# Patient Record
Sex: MALE | Race: WHITE | Employment: UNEMPLOYED | URBAN - METROPOLITAN AREA
[De-identification: names, ages, dates, MRNs, and addresses within clinical notes are randomized per-mention and may not be internally consistent; named-entity substitution may affect disease eponyms.]

---

## 2020-07-22 ENCOUNTER — TELEPHONE (OUTPATIENT)
Dept: PRIMARY CARE CLINIC | Age: 60
End: 2020-07-22

## 2020-07-22 DIAGNOSIS — Z79.01 LONG TERM (CURRENT) USE OF ANTICOAGULANTS: Primary | ICD-10-CM

## 2020-07-28 NOTE — TELEPHONE ENCOUNTER
OK please let him know his INR was 3.2 at goal range. I put in some new  Standing  Orders. Mena Garcia He can k start checking 1 x per month now since its stable.

## 2020-07-28 NOTE — TELEPHONE ENCOUNTER
INR   3.2   0.8-1.2   above high normal   final   01                  Reference interval is for non-anticoagulated patients.                     Suggested INR therapeutic range for Vitamin K                 antagonist therapy:                    Standard Dose (moderate intensity                                   therapeutic range):       2.0 - 3.0                    Higher intensity therapeutic range       2.5 - 3.5  prothrombin time   32.2   9.1-12.0   sec   above high normal

## 2020-08-18 ENCOUNTER — TELEPHONE (OUTPATIENT)
Dept: PRIMARY CARE CLINIC | Age: 60
End: 2020-08-18

## 2020-08-18 DIAGNOSIS — Z79.01 LONG TERM (CURRENT) USE OF ANTICOAGULANTS: Primary | ICD-10-CM

## 2020-08-19 LAB
INR PPP: 3.3 (ref 0.8–1.2)
PROTHROMBIN TIME: 33.9 SEC (ref 9.1–12)

## 2020-08-19 NOTE — TELEPHONE ENCOUNTER
INR 3.3. At goal .  I thought pt was  Getting labs monthly no longer ever 2 weeks since his numbers are stable.

## 2020-08-20 ENCOUNTER — TELEPHONE (OUTPATIENT)
Dept: PRIMARY CARE CLINIC | Age: 60
End: 2020-08-20

## 2020-09-15 LAB
INR PPP: 3.4 (ref 0.8–1.2)
PROTHROMBIN TIME: 34.7 SEC (ref 9.1–12)

## 2020-09-17 ENCOUNTER — TELEPHONE (OUTPATIENT)
Dept: PRIMARY CARE CLINIC | Age: 60
End: 2020-09-17

## 2020-09-22 ENCOUNTER — TELEPHONE (OUTPATIENT)
Dept: PRIMARY CARE CLINIC | Age: 60
End: 2020-09-22

## 2020-09-22 NOTE — TELEPHONE ENCOUNTER
Patient is requesting his test results from his lab work that he had done last Monday he said that he went onto the website and they stated that they already sent the results to us on last Wednesday so he wants a call asap at [phone].

## 2020-09-22 NOTE — TELEPHONE ENCOUNTER
I spoke about his labs. Pt said last month (08/2020) his INR was 3.3.   *Pt said he will need a new order if he have to go and get his INR check in 2 weeks. *Pt want to know should he just take 4mg of his Warfarin every day instead of changing the order for him to go and BW done in 2wks? *Pt said he take Warfarin 4mg every day but Monday and Thursday he take 6mg he said he can do 4mg or 4.5mg on Monday and Thursday and keep the other days the same dose. INR 3.4. at upperlimit of goal  Recheck in 2 weeks instead of 1 month.

## 2020-09-23 NOTE — TELEPHONE ENCOUNTER
I like pts martha. Since its the same as last month then OK to wait a month for recheck. How about  4 mg everyday except  Sarai Zazueta he does 5 mg? Does he need any 1 mg or 5 mg tabs?

## 2020-09-24 NOTE — TELEPHONE ENCOUNTER
Pt is going to do 6 on wed and 4 the rest of the days.  Confirm with Dr. gomez that, that is fine and pt made aware

## 2020-10-13 LAB
INR PPP: 2.7 (ref 0.9–1.2)
PROTHROMBIN TIME: 28 SEC (ref 9.1–12)

## 2020-10-15 ENCOUNTER — TELEPHONE (OUTPATIENT)
Dept: PRIMARY CARE CLINIC | Age: 60
End: 2020-10-15

## 2020-11-10 LAB
INR PPP: 2.8 (ref 0.9–1.2)
PROTHROMBIN TIME: 28.6 SEC (ref 9.1–12)

## 2020-12-09 LAB
INR PPP: 2.7 (ref 0.9–1.2)
PROTHROMBIN TIME: 28 SEC (ref 9.1–12)

## 2020-12-13 DIAGNOSIS — Z79.01 LONG TERM (CURRENT) USE OF ANTICOAGULANTS: ICD-10-CM

## 2021-01-04 ENCOUNTER — TELEPHONE (OUTPATIENT)
Dept: PRIMARY CARE CLINIC | Age: 61
End: 2021-01-04

## 2021-01-04 DIAGNOSIS — Z79.01 LONG TERM (CURRENT) USE OF ANTICOAGULANTS: Primary | ICD-10-CM

## 2021-01-05 LAB
INR PPP: 2.2 (ref 0.9–1.2)
PROTHROMBIN TIME: 22.9 SEC (ref 9.1–12)

## 2021-01-05 NOTE — TELEPHONE ENCOUNTER
Completed. Can you check his results from yesterday.  It looks like it was a little low from what it is normally

## 2021-01-13 DIAGNOSIS — Z79.01 LONG TERM (CURRENT) USE OF ANTICOAGULANTS: ICD-10-CM

## 2021-01-19 LAB
INR PPP: 2.8 (ref 0.9–1.2)
PROTHROMBIN TIME: 28.4 SEC (ref 9.1–12)

## 2021-03-02 LAB
INR PPP: 3.8 (ref 0.9–1.2)
PROTHROMBIN TIME: 38.6 SEC (ref 9.1–12)

## 2021-03-03 NOTE — PROGRESS NOTES
INR 3.8  Ask pt to skip a dose and recheck in 1 week. Tell pt I am leaving and he will need to re-establish with another provider here soon to avoid gap in care.

## 2021-03-10 LAB
INR PPP: 2.6 (ref 0.9–1.2)
PROTHROMBIN TIME: 27.2 SEC (ref 9.1–12)

## 2021-04-05 DIAGNOSIS — Z79.01 LONG TERM (CURRENT) USE OF ANTICOAGULANTS: Primary | ICD-10-CM

## 2021-04-06 LAB
INR PPP: 3.3 (ref 0.9–1.2)
PROTHROMBIN TIME: 33.9 SEC (ref 9.1–12)

## 2021-04-09 ENCOUNTER — TELEPHONE (OUTPATIENT)
Dept: PRIMARY CARE CLINIC | Age: 61
End: 2021-04-09

## 2021-04-09 NOTE — TELEPHONE ENCOUNTER
Called and updated of bw results per Dr. Bert Villafana note. Patient to f/u with Dr. Sarah Franks next week.  --Fredy Angel

## 2021-04-20 ENCOUNTER — VIRTUAL VISIT (OUTPATIENT)
Dept: PRIMARY CARE CLINIC | Age: 61
End: 2021-04-20
Payer: MEDICARE

## 2021-04-20 DIAGNOSIS — Z92.29 COVID-19 VACCINE SERIES COMPLETED: ICD-10-CM

## 2021-04-20 DIAGNOSIS — E27.40 ADRENAL INSUFFICIENCY (HCC): ICD-10-CM

## 2021-04-20 DIAGNOSIS — Z86.718 HISTORY OF DVT (DEEP VEIN THROMBOSIS): ICD-10-CM

## 2021-04-20 DIAGNOSIS — Z86.2 HISTORY OF SARCOIDOSIS: ICD-10-CM

## 2021-04-20 DIAGNOSIS — Z79.01 CURRENT USE OF LONG TERM ANTICOAGULATION: Primary | ICD-10-CM

## 2021-04-20 PROCEDURE — 3017F COLORECTAL CA SCREEN DOC REV: CPT | Performed by: FAMILY MEDICINE

## 2021-04-20 PROCEDURE — 99214 OFFICE O/P EST MOD 30 MIN: CPT | Performed by: FAMILY MEDICINE

## 2021-04-20 PROCEDURE — G8427 DOCREV CUR MEDS BY ELIG CLIN: HCPCS | Performed by: FAMILY MEDICINE

## 2021-04-20 PROCEDURE — G8432 DEP SCR NOT DOC, RNG: HCPCS | Performed by: FAMILY MEDICINE

## 2021-04-20 RX ORDER — HYDROCORTISONE 5 MG/1
5 TABLET ORAL
COMMUNITY
Start: 2021-03-21

## 2021-04-20 RX ORDER — HYDROCORTISONE 10 MG/1
10 TABLET ORAL DAILY
COMMUNITY
Start: 2021-03-25

## 2021-04-20 RX ORDER — WARFARIN 4 MG/1
4 TABLET ORAL DAILY
COMMUNITY
Start: 2021-02-27 | End: 2021-05-27 | Stop reason: SDUPTHER

## 2021-04-20 NOTE — PROGRESS NOTES
There were no vitals taken for this visit. 1. Have you been to the ER, urgent care clinic since your last visit? Hospitalized since your last visit? No    2. Have you seen or consulted any other health care providers outside of the [address] [place] [address] since your last visit? Include any pap smears or colon screening.  No   Chief Complaint   Patient presents with   [address]

## 2021-04-20 NOTE — PROGRESS NOTES
HPI     Chief Complaint   Patient presents with    Establish Care        HPI:  Nilo Ocampo is a 61 y.o. male who has a history of lupus anticoagulant and history of DVT and sarcoidosis. He also has a history of adrenal insufficiency likely 2/2 steroid use for sarcoid. He is on coumadin 4mg daily, aside from Wednesday he takes 6mg. He says a previous hematologist told him his goal INR was 2.5-3.5 due to his complex history. He has not followed up with hematology in some time, does not want to pay $50 copay for each visit. States that he is on hydrocort for adrenal insufficiency. Could not stop therapy prior to COVID vaccines. Wants antibody test. Also wants to check his platelets. Review of Systems   Constitutional: Negative for chills and fever. Respiratory: Negative for shortness of breath. Cardiovascular: Negative for chest pain. Gastrointestinal: Negative for blood in stool. Reviewed PmHx, FmHx, SocHx as well as meds and allergies, updated and dated in the chart. Objective     Vital Signs: (As obtained by patient/caregiver at home)  There were no vitals taken for this visit. Patient-Reported Vitals 4/20/2021   Patient-Reported Weight 155lbs     Patient with poor service and difficulty connecting to video.     [INSTRUCTIONS:  \"[x]\" Indicates a positive item  \"[]\" Indicates a negative item  -- DELETE ALL ITEMS NOT EXAMINED]    Constitutional: [] Appears well-developed and well-nourished [x] No apparent distress      [] Abnormal -     Mental status: [x] Alert and awake  [x] Oriented to person/place/time [] Able to follow commands    [] Abnormal -     Eyes:   EOM    []  Normal    [] Abnormal -   Sclera  []  Normal    [] Abnormal -          Discharge []  None visible   [] Abnormal -     HENT: [] Normocephalic, atraumatic  [] Abnormal -   [] Mouth/Throat: Mucous membranes are moist    External Ears [x] Normal  [] Abnormal -    Neck: [] No visualized mass [] Abnormal - Pulmonary/Chest: [] Respiratory effort normal   [] No visualized signs of difficulty breathing or respiratory distress        [] Abnormal -      Musculoskeletal:   [] Normal gait with no signs of ataxia         [] Normal range of motion of neck        [] Abnormal -     Neurological:        [] No Facial Asymmetry (Cranial nerve 7 motor function) (limited exam due to video visit)          [] No gaze palsy        [] Abnormal -          Skin:        [] No significant exanthematous lesions or discoloration noted on facial skin         [] Abnormal -            Psychiatric:       [] Normal Affect [] Abnormal -        [] No Hallucinations    Other pertinent observable physical exam findings:-     On this date 04/20/2021 I have spent 30 minutes reviewing previous notes, test results and face to face (virtual) with the patient discussing the diagnosis and importance of compliance with the treatment plan as well as documenting on the day of the visit. Assessment and Plan     Diagnoses and all orders for this visit:    1. Current use of long term anticoagulation: should touch base with hematology again. Referral placed. Already has year long standing order for PT/INR checks.   -     REFERRAL TO HEMATOLOGY  -     CBC W/O DIFF  -      PT/INR    2. History of DVT (deep vein thrombosis): see above  -     REFERRAL TO HEMATOLOGY    3. COVID-19 vaccine series completed: discussed limitations of antibody testing. Patient verbalized understanding and still wants to check. - SARS COV 2 IGG    4. Adrenal insufficiency: continue steroids, followed by endocrinology. 5. History of sarcoidosis    Follow-up and Dispositions    · Return in about 3 months (around 7/20/2021) for chronic follow up, annual physical.          Nicki Rowan is being evaluated by a Virtual Visit (video visit) encounter to address concerns as mentioned above. A caregiver was present when appropriate.  Due to this being a TeleHealth encounter (During WHIZD-75 public health emergency), evaluation of the following organ systems was limited: Vitals/Constitutional/EENT/Resp/CV/GI//MS/Neuro/Skin/Heme-Lymph-Imm. Pursuant to the emergency declaration under the [facility], [address] [place] [address] authority and the [employer] Act, this Virtual Visit was conducted with patient's (and/or legal guardian's) consent, to reduce the patient's risk of exposure to COVID-19 and provide necessary medical care. The patient (and/or legal guardian) has also been advised to contact this office for worsening conditions or problems, and seek emergency medical treatment and/or call 911 if deemed necessary. Patient identification was verified at the start of the visit: YES    Services were provided through a synchronous discussion virtually to substitute for in-person clinic visit. Patient was located at home and provider was located in office or at home.        Austyn Romero MD  [address] [place] [address]

## 2021-05-04 ENCOUNTER — TELEPHONE (OUTPATIENT)
Dept: PRIMARY CARE CLINIC | Age: 61
End: 2021-05-04

## 2021-05-04 NOTE — TELEPHONE ENCOUNTER
Just an FYI, it appears patient is going to be seeing you from now on. Pt hs a standing order for PT/INR order by Dr. Yoandy Jones that expires in July.  He usually comes in once a months to get it check

## 2021-05-06 LAB
DIASORIN SARS-COV-2 AB, IGG, RCVG3T: POSITIVE
ERYTHROCYTE [DISTWIDTH] IN BLOOD BY AUTOMATED COUNT: 14.7 % (ref 11.6–15.4)
HCT VFR BLD AUTO: 42.4 % (ref 37.5–51)
HGB BLD-MCNC: 14 G/DL (ref 13–17.7)
INR PPP: 3.8 (ref 0.9–1.2)
MCH RBC QN AUTO: 26.3 PG (ref 26.6–33)
MCHC RBC AUTO-ENTMCNC: 33 G/DL (ref 31.5–35.7)
MCV RBC AUTO: 80 FL (ref 79–97)
PLATELET # BLD AUTO: 171 X10E3/UL (ref 150–450)
PROTHROMBIN TIME: 38.6 SEC (ref 9.1–12)
RBC # BLD AUTO: 5.33 X10E6/UL (ref 4.14–5.8)
WBC # BLD AUTO: 9.9 X10E3/UL (ref 3.4–10.8)

## 2021-05-07 DIAGNOSIS — Z79.01 LONG TERM (CURRENT) USE OF ANTICOAGULANTS: Primary | ICD-10-CM

## 2021-05-07 NOTE — TELEPHONE ENCOUNTER
Pls call patient. Platelets normal.  COVID antibodies present. Still practice safety as recommended by CDC. PT slightly elevated. Any changes to routine? We should recheck this in a week. Lab order placed. Come in by Wednesday to have lab drawn.     Dr. Raymond Chin

## 2021-05-11 NOTE — TELEPHONE ENCOUNTER
Called and updated on bw results. Coming in for bw on 5/17/2021. No other concerns at this time.  --Patricia Lopez

## 2021-05-27 ENCOUNTER — TELEPHONE (OUTPATIENT)
Dept: PRIMARY CARE CLINIC | Age: 61
End: 2021-05-27

## 2021-05-27 RX ORDER — WARFARIN 4 MG/1
4 TABLET ORAL DAILY
Qty: 32 TABLET | Refills: 2 | Status: SHIPPED | OUTPATIENT
Start: 2021-05-27 | End: 2021-11-02 | Stop reason: SDUPTHER

## 2021-05-27 NOTE — TELEPHONE ENCOUNTER
Pt said his pharmacy e-scripted a refill request for his warfarin but we dont have it in chart -- please refill and send to [facility] on [address] [phone]

## 2021-06-23 LAB
INR PPP: 3 (ref 0.9–1.2)
PROTHROMBIN TIME: 30.5 SEC (ref 9.1–12)

## 2021-07-01 ENCOUNTER — TELEPHONE (OUTPATIENT)
Dept: PRIMARY CARE CLINIC | Age: 61
End: 2021-07-01

## 2021-07-20 ENCOUNTER — TELEPHONE (OUTPATIENT)
Dept: PRIMARY CARE CLINIC | Age: 61
End: 2021-07-20

## 2021-07-20 DIAGNOSIS — Z79.01 CURRENT USE OF LONG TERM ANTICOAGULATION: Primary | ICD-10-CM

## 2021-07-20 DIAGNOSIS — Z86.718 HISTORY OF DVT (DEEP VEIN THROMBOSIS): Primary | ICD-10-CM

## 2021-07-20 DIAGNOSIS — Z79.01 CURRENT USE OF LONG TERM ANTICOAGULATION: ICD-10-CM

## 2021-07-21 LAB
INR PPP: 2.8 (ref 0.9–1.2)
PROTHROMBIN TIME: 28.7 SEC (ref 9.1–12)

## 2021-07-22 NOTE — PROGRESS NOTES
INR at goal.   Hx of DVT. Goal 2.5-3.5 per patient (hx of lupus anticoagulant syndrome as well). Follow up in 1 month.      Dr. Darren Phillips

## 2021-08-17 LAB
INR PPP: 2.9 (ref 0.9–1.2)
PROTHROMBIN TIME: 29.4 SEC (ref 9.1–12)

## 2021-08-24 NOTE — PROGRESS NOTES
Called and talked with patient regarding bw. No changes and repeat in one month. No other concerns at this time.

## 2021-09-21 ENCOUNTER — TELEPHONE (OUTPATIENT)
Dept: PRIMARY CARE CLINIC | Age: 61
End: 2021-09-21

## 2021-09-21 NOTE — TELEPHONE ENCOUNTER
Called and talked with patient regarding bw results. Continue with current plan and repeat in one month.

## 2021-10-12 ENCOUNTER — DOCUMENTATION ONLY (OUTPATIENT)
Dept: PRIMARY CARE CLINIC | Age: 61
End: 2021-10-12

## 2021-10-12 ENCOUNTER — TELEPHONE (OUTPATIENT)
Dept: PRIMARY CARE CLINIC | Age: 61
End: 2021-10-12

## 2021-10-12 DIAGNOSIS — R79.1 SUPRATHERAPEUTIC INR: Primary | ICD-10-CM

## 2021-10-12 LAB
INR PPP: 2.8 (ref 0.9–1.2)
INR PPP: 7 (ref 0.9–1.2)
PROTHROMBIN TIME: 26.8 SEC (ref 9.1–12)
PROTHROMBIN TIME: 68.1 SEC (ref 9.1–12)

## 2021-10-12 RX ORDER — WARFARIN 3 MG/1
TABLET ORAL
Qty: 36 TABLET | Refills: 1 | Status: SHIPPED | OUTPATIENT
Start: 2021-10-12 | End: 2022-02-08

## 2021-10-12 NOTE — PROGRESS NOTES
Received notice for supratherapeutic INR of 7.0    No active signs of bleeding reported to nursing. Holding coumadin today and tomorrow. Checking labs daily through Friday. Restarting coumadin on Thursday: Patient is to take 3mg Tuesday, Thursday, and Saturday. Patient is to continue 4mg on the other days. Will recheck INR on the new dose on Monday. Bleeding precautions to be reviewed with patient by nursing.     Arline Yang MD

## 2021-10-12 NOTE — PROGRESS NOTES
Received notice from nursing for INR 7.0. No signs of active bleeding. Hold coumadin today and tomorrow. Restart on Thursday. Will lower weekly total dose: 3mg on Tuesday/Thursday/Saturday. 4mg the remaining days. Checking Coumadin level daily today through Friday and again on Monday. Will adjust recs as needed. Bleeding precautions reviewed with patient per nursing.      Dr. Helms

## 2021-10-13 NOTE — PROGRESS NOTES
INR noted. Spoke with NP Bianca Edwards yesterday evening as she received an on call message regarding his repeat INR which is now 2.8 (previously reported at 7.0)    Patient is to continue coumdain at 4mg daily and will recheck PT/INR on Friday.     Dr. YIP [facility]

## 2021-10-16 LAB
INR PPP: 2.6 (ref 0.9–1.2)
PROTHROMBIN TIME: 26.2 SEC (ref 9.1–12)

## 2021-11-02 RX ORDER — WARFARIN 4 MG/1
4 TABLET ORAL DAILY
Qty: 32 TABLET | Refills: 2 | Status: SHIPPED | OUTPATIENT
Start: 2021-11-02 | End: 2022-01-28

## 2021-11-16 LAB
INR PPP: 3 (ref 0.9–1.2)
PROTHROMBIN TIME: 30.4 SEC (ref 9.1–12)

## 2021-12-14 DIAGNOSIS — Z86.718 HISTORY OF DVT (DEEP VEIN THROMBOSIS): ICD-10-CM

## 2021-12-14 DIAGNOSIS — Z79.01 CURRENT USE OF LONG TERM ANTICOAGULATION: Primary | ICD-10-CM

## 2021-12-14 LAB
INR PPP: 2.4 (ref 0.9–1.2)
PROTHROMBIN TIME: 24.3 SEC (ref 9.1–12)

## 2022-01-11 LAB
INR PPP: 2.5 (ref 0.9–1.2)
PROTHROMBIN TIME: 25.7 SEC (ref 9.1–12)

## 2022-01-28 RX ORDER — WARFARIN 4 MG/1
TABLET ORAL
Qty: 90 TABLET | Refills: 1 | Status: SHIPPED | OUTPATIENT
Start: 2022-01-28 | End: 2022-04-14 | Stop reason: SDUPTHER

## 2022-02-08 DIAGNOSIS — Z86.718 HISTORY OF DVT (DEEP VEIN THROMBOSIS): Primary | ICD-10-CM

## 2022-02-08 DIAGNOSIS — Z79.01 CURRENT USE OF LONG TERM ANTICOAGULATION: ICD-10-CM

## 2022-02-08 LAB
INR PPP: 2.2 (ref 0.9–1.2)
PROTHROMBIN TIME: 22.8 SEC (ref 9.1–12)

## 2022-02-08 NOTE — PROGRESS NOTES
Rupa, please arrange lab only appt. I called patient, INR lower- goal 2.5-3.5. Patient is doing coumadin 4mg daily currently. He will continue 4mg daily but 6mg on Tuesday and Thursday. Lab check next Monday or Tuesday.

## 2022-02-14 ENCOUNTER — TELEPHONE (OUTPATIENT)
Dept: PRIMARY CARE CLINIC | Age: 62
End: 2022-02-14

## 2022-02-14 DIAGNOSIS — Z86.718 HISTORY OF DVT (DEEP VEIN THROMBOSIS): Primary | ICD-10-CM

## 2022-02-14 DIAGNOSIS — Z79.01 CURRENT USE OF LONG TERM ANTICOAGULATION: ICD-10-CM

## 2022-02-14 NOTE — TELEPHONE ENCOUNTER
Patient is asking that you extend his standing lab orders for another year. He says he will not come in for an appointment of any type until he is certain the pandemic is over.  So needs these to be extended beyond April

## 2022-02-15 DIAGNOSIS — Z86.718 HISTORY OF DVT (DEEP VEIN THROMBOSIS): Primary | ICD-10-CM

## 2022-02-15 DIAGNOSIS — Z79.01 CURRENT USE OF LONG TERM ANTICOAGULATION: ICD-10-CM

## 2022-02-15 LAB
INR PPP: 2.5 (ref 0.9–1.2)
PROTHROMBIN TIME: 25.1 SEC (ref 9.1–12)

## 2022-02-15 RX ORDER — WARFARIN 2 MG/1
TABLET ORAL
Qty: 8 TABLET | Refills: 2 | Status: SHIPPED | OUTPATIENT
Start: 2022-02-15 | End: 2022-04-14 | Stop reason: SDUPTHER

## 2022-02-15 NOTE — TELEPHONE ENCOUNTER
I called and spoke with patient. Patient's previous PCP left clinic. He had standing lab orders which I continued as a courtesy. Appears patient has not even seen PCP in quite some time (at least 2 years). Explained he needs an in office appt and eval to establish care and physical as I cannot continue labs indefinitely and labs were already extended. He verbalized understanding.      Dr. Nj Flank

## 2022-02-15 NOTE — TELEPHONE ENCOUNTER
Patient notified of INR, 2.5. He's doing 6mg T and R and 4mg all other days for one week now. Will recheck INR in 1 week and make more adjustments if needed.

## 2022-03-01 LAB
INR PPP: 3.7 (ref 0.9–1.2)
PROTHROMBIN TIME: 37.1 SEC (ref 9.1–12)

## 2022-03-02 NOTE — PROGRESS NOTES
INR supratherapeutic. Will cut back: patient is to do 6mg only on Wednesday, 4mg every other day. Bleeding precautions given.    Lab visit to do lab PT/INR check, patient prefers 11:30A

## 2022-03-15 LAB
INR PPP: 3.2 (ref 0.9–1.2)
PROTHROMBIN TIME: 31.8 SEC (ref 9.1–12)

## 2022-03-18 PROBLEM — Z86.718 HISTORY OF DVT (DEEP VEIN THROMBOSIS): Status: ACTIVE | Noted: 2021-04-20

## 2022-03-19 PROBLEM — Z79.01 CURRENT USE OF LONG TERM ANTICOAGULATION: Status: ACTIVE | Noted: 2021-04-20

## 2022-04-13 ENCOUNTER — OFFICE VISIT (OUTPATIENT)
Dept: PRIMARY CARE CLINIC | Age: 62
End: 2022-04-13
Payer: MEDICARE

## 2022-04-13 VITALS
SYSTOLIC BLOOD PRESSURE: 138 MMHG | DIASTOLIC BLOOD PRESSURE: 83 MMHG | WEIGHT: 154.2 LBS | TEMPERATURE: 97.4 F | OXYGEN SATURATION: 98 % | HEART RATE: 65 BPM | RESPIRATION RATE: 20 BRPM

## 2022-04-13 DIAGNOSIS — Z79.01 CURRENT USE OF LONG TERM ANTICOAGULATION: ICD-10-CM

## 2022-04-13 DIAGNOSIS — Z12.5 SCREENING FOR MALIGNANT NEOPLASM OF PROSTATE: ICD-10-CM

## 2022-04-13 DIAGNOSIS — R74.8 ELEVATED ALKALINE PHOSPHATASE LEVEL: ICD-10-CM

## 2022-04-13 DIAGNOSIS — Z93.3 S/P COLOSTOMY (HCC): ICD-10-CM

## 2022-04-13 DIAGNOSIS — Z13.6 ENCOUNTER FOR SCREENING FOR CARDIOVASCULAR DISORDERS: ICD-10-CM

## 2022-04-13 DIAGNOSIS — Z11.59 NEED FOR HEPATITIS C SCREENING TEST: ICD-10-CM

## 2022-04-13 DIAGNOSIS — Z86.2 HISTORY OF SARCOIDOSIS: ICD-10-CM

## 2022-04-13 DIAGNOSIS — E27.40 ADRENAL INSUFFICIENCY (HCC): ICD-10-CM

## 2022-04-13 DIAGNOSIS — Z12.11 SCREENING FOR MALIGNANT NEOPLASM OF COLON: ICD-10-CM

## 2022-04-13 DIAGNOSIS — Z00.00 MEDICARE ANNUAL WELLNESS VISIT, SUBSEQUENT: Primary | ICD-10-CM

## 2022-04-13 DIAGNOSIS — Z86.718 HISTORY OF DVT (DEEP VEIN THROMBOSIS): ICD-10-CM

## 2022-04-13 PROCEDURE — G0439 PPPS, SUBSEQ VISIT: HCPCS | Performed by: FAMILY MEDICINE

## 2022-04-13 PROCEDURE — G8427 DOCREV CUR MEDS BY ELIG CLIN: HCPCS | Performed by: FAMILY MEDICINE

## 2022-04-13 PROCEDURE — G0103 PSA SCREENING: HCPCS | Performed by: FAMILY MEDICINE

## 2022-04-13 PROCEDURE — G8510 SCR DEP NEG, NO PLAN REQD: HCPCS | Performed by: FAMILY MEDICINE

## 2022-04-13 PROCEDURE — 3017F COLORECTAL CA SCREEN DOC REV: CPT | Performed by: FAMILY MEDICINE

## 2022-04-13 PROCEDURE — G8421 BMI NOT CALCULATED: HCPCS | Performed by: FAMILY MEDICINE

## 2022-04-13 RX ORDER — LANOLIN ALCOHOL/MO/W.PET/CERES
CREAM (GRAM) TOPICAL
COMMUNITY
Start: 2022-04-02

## 2022-04-13 RX ORDER — SODIUM CHLORIDE TAB 1 GM 1 G
1 TAB MISCELLANEOUS DAILY
COMMUNITY
Start: 2022-02-22

## 2022-04-13 NOTE — PROGRESS NOTES
1. \"Have you been to the ER, urgent care clinic since your last visit? Hospitalized since your last visit? \" No    2. \"Have you seen or consulted any other health care providers outside of the [address] [place] [address] since your last visit? \" No     3. For patients aged 39-70: Has the patient had a colonoscopy / FIT/ Cologuard? No      If the patient is female:    4. For patients aged 41-77: Has the patient had a mammogram within the past 2 years? NA - based on age or sex      11. For patients aged 21-65: Has the patient had a pap smear?  NA - based on age or sex  Visit Vitals  /83 (BP 1 Location: Right upper arm, BP Patient Position: Sitting, BP Cuff Size: Large adult)   Pulse 65   Temp 97.4 °F (36.3 °C) (Temporal)   Resp 20   Wt 154 lb 3.2 oz (69.9 kg)   SpO2 98%     Chief Complaint   Patient presents with    Follow-up

## 2022-04-13 NOTE — PROGRESS NOTES
HPI     Chief Complaint   Patient presents with    Follow-up       Bridget Jordan is a 64 y.o. male presenting for well male exam and is also due for follow up care of chronic issues. Bridget Jordan is willing to do both appointments today and realizes that there may be a co-pay for the follow-up portion of the visit. Diet and Exercise  Not much exercise. No recent dietary changes    Health Maintenance reviewed -  HCV screening- ordered  PHQ2 Score = negative  PSA: DUE  Colonoscopy has colostomy but states he still has some colon left. Has not had colonoscopy in some time. Low dose CT scan- not indicated  AAA screening- not indicated    Acute Problems/Concerns: none    Colostomy: years ago patient suffered erforated diverticulitis resulting in colostomy. No melena. History of DVT: is on chronic coumadin, no hematuria or melena. Adrenal Insufficiency: Follows with Dr. Patricio Aguilar. 2/2 chronic steroid use for Sarcoidosis. Denies new fatigue. Sarcoidosis: was seeing Dr. Marilyn Rainey but now follows with Dr. Catalino Singh at [facility]. 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 He has chronic dyspnea. Allergies- reviewed    Not on File    Medications- reviewed  Current Outpatient Medications   Medication Sig    ferrous sulfate 325 mg (65 mg iron) tablet TAKE 1 TABLET BY MOUTH EVERY DAY FOR 2 MONTHS THEN STOP    sodium chloride 1 gram tablet Take 1 g by mouth daily.  warfarin (COUMADIN) 2 mg tablet Take 1 tab po on Tuesdays and Thursdays.  warfarin (COUMADIN) 4 mg tablet TAKE 1 TABLET BY MOUTH EVERY DAY    hydrocortisone (CORTEF) 10 mg tablet Take 10 mg by mouth daily. q am, with breakfast    hydrocortisone (CORTEF) 5 mg tablet Take 5 mg by mouth daily (after dinner). No current facility-administered medications for this visit.        Immunizations - reviewed:   Immunization History   Administered Date(s) Administered    COVID-19, Pfizer Purple top, DILUTE for use, 12+ yrs, 30mcg/0.3mL dose 03/20/2021, 04/10/2021, 08/26/2021, 01/19/2022    Influenza Vaccine 09/17/2021    Pneumococcal Conjugate (PCV-13) 10/20/2016    Pneumococcal Polysaccharide (PPSV-23) 12/05/2011, 10/24/2017    Tdap 05/07/2020     Flu: recommended every fall. Review of Systems   Review of Systems   Constitutional: Negative for chills and fever. Respiratory: Negative for cough and shortness of breath. Cardiovascular: Negative for chest pain and palpitations. Psychiatric/Behavioral: Negative for hallucinations and substance abuse. Reviewed PmHx, FmHx, SocHx as well as meds and allergies, updated and dated in the chart. Social History     Tobacco Use    Smoking status: Never Smoker    Smokeless tobacco: Never Used   Vaping Use    Vaping Use: Never used   Substance Use Topics    Alcohol use: Not Currently    Drug use: Never     Past Medical History:   Diagnosis Date    Lupus anticoagulant syndrome ([facility] 2010    Thromboembolus ([facility]      Family History   Problem Relation Age of Onset    Cancer Mother     Cancer Father           Objective     Physical Exam  Visit Vitals  /83 (BP 1 Location: Right upper arm, BP Patient Position: Sitting, BP Cuff Size: Large adult)   Pulse 65   Temp 97.4 °F (36.3 °C) (Temporal)   Resp 20   Wt 154 lb 3.2 oz (69.9 kg)   SpO2 98%       Physical Exam  Vitals and nursing note reviewed. Constitutional:       General: He is not in acute distress. Cardiovascular:      Rate and Rhythm: Normal rate and regular rhythm. Pulmonary:      Effort: Pulmonary effort is normal. No respiratory distress. Breath sounds: Normal breath sounds. Neurological:      General: No focal deficit present. Mental Status: He is alert and oriented to person, place, and time.    Psychiatric:         Mood and Affect: Mood normal.         Behavior: Behavior normal.               Assessment and Plan     Diagnoses and all orders for this visit: 1. Medicare annual wellness visit, subsequent  Comments:  Age appropriate guidance and HM updated. 2. History of DVT (deep vein thrombosis)  Comments:  PT/INR check today. Continue coumadin and adjust as needed. Orders:  -     CBC W/O DIFF    3. History of sarcoidosis  Comments:  Now following with Pulmonology. Requesting records from Dr. Marquis Russell. Orders:  -     CBC W/O DIFF  -     METABOLIC PANEL, COMPREHENSIVE    4. Current use of long term anticoagulation  -     CBC W/O DIFF  -     PROTHROMBIN TIME + INR  -     PROTHROMBIN TIME + INR; Future  -     PROTHROMBIN TIME + INR; Future  -     PROTHROMBIN TIME + INR; Future  -     PROTHROMBIN TIME + INR; Future  -     PROTHROMBIN TIME + INR; Future    5. Adrenal insufficiency ([facility]  Comments: Followed by Dr. Joseph Gautam, Endocrinology. Orders:  -     CBC W/O DIFF    6. S/[facility] ([facility]  Comments:  Colostomy in place. Continue care. Orders:  -     REFERRAL TO GASTROENTEROLOGY    7. Encounter for screening for cardiovascular disorders  -     LIPID PANEL    8. Need for hepatitis C screening test  -     HEPATITIS C AB    9. Screening for malignant neoplasm of colon    10. Screening for malignant neoplasm of prostate  -     PSA SCREENING         Follow-up and Dispositions    · Return in about 6 months (around 10/13/2022) for chronic follow up. I discussed the aforementioned diagnoses with the patient as well as the plan of care. All questions were answered and an AVS was provided.      Leslie Art MD  [address] [place] [address]

## 2022-04-14 PROBLEM — R74.8 ELEVATED ALKALINE PHOSPHATASE LEVEL: Status: ACTIVE | Noted: 2022-04-14

## 2022-04-14 PROBLEM — E78.5 DYSLIPIDEMIA: Status: ACTIVE | Noted: 2022-04-14

## 2022-04-14 LAB
ALBUMIN SERPL-MCNC: 4.4 G/DL (ref 3.8–4.8)
ALBUMIN/GLOB SERPL: 1.4 {RATIO} (ref 1.2–2.2)
ALP SERPL-CCNC: 195 IU/L (ref 44–121)
ALT SERPL-CCNC: 27 IU/L (ref 0–44)
AST SERPL-CCNC: 39 IU/L (ref 0–40)
BILIRUB SERPL-MCNC: 2.7 MG/DL (ref 0–1.2)
BUN SERPL-MCNC: 12 MG/DL (ref 8–27)
BUN/CREAT SERPL: 14 (ref 10–24)
CALCIUM SERPL-MCNC: 9.3 MG/DL (ref 8.6–10.2)
CHLORIDE SERPL-SCNC: 91 MMOL/L (ref 96–106)
CHOLEST SERPL-MCNC: 198 MG/DL (ref 100–199)
CO2 SERPL-SCNC: 21 MMOL/L (ref 20–29)
CREAT SERPL-MCNC: 0.88 MG/DL (ref 0.76–1.27)
EGFR: 98 ML/MIN/1.73
ERYTHROCYTE [DISTWIDTH] IN BLOOD BY AUTOMATED COUNT: 13.4 % (ref 11.6–15.4)
GLOBULIN SER CALC-MCNC: 3.1 G/DL (ref 1.5–4.5)
GLUCOSE SERPL-MCNC: 79 MG/DL (ref 65–99)
HCT VFR BLD AUTO: 42.8 % (ref 37.5–51)
HCV AB S/CO SERPL IA: <0.1 S/CO RATIO (ref 0–0.9)
HDLC SERPL-MCNC: 45 MG/DL
HGB BLD-MCNC: 15 G/DL (ref 13–17.7)
INR PPP: 2.9 (ref 0.9–1.2)
LDLC SERPL CALC-MCNC: 135 MG/DL (ref 0–99)
MCH RBC QN AUTO: 29.5 PG (ref 26.6–33)
MCHC RBC AUTO-ENTMCNC: 35 G/DL (ref 31.5–35.7)
MCV RBC AUTO: 84 FL (ref 79–97)
PLATELET # BLD AUTO: 157 X10E3/UL (ref 150–450)
POTASSIUM SERPL-SCNC: 4.7 MMOL/L (ref 3.5–5.2)
PROT SERPL-MCNC: 7.5 G/DL (ref 6–8.5)
PROTHROMBIN TIME: 28.7 SEC (ref 9.1–12)
RBC # BLD AUTO: 5.09 X10E6/UL (ref 4.14–5.8)
SODIUM SERPL-SCNC: 129 MMOL/L (ref 134–144)
TRIGL SERPL-MCNC: 99 MG/DL (ref 0–149)
VLDLC SERPL CALC-MCNC: 18 MG/DL (ref 5–40)
WBC # BLD AUTO: 7.8 X10E3/UL (ref 3.4–10.8)

## 2022-04-14 RX ORDER — WARFARIN 2 MG/1
TABLET ORAL
Qty: 8 TABLET | Refills: 2 | Status: SHIPPED | OUTPATIENT
Start: 2022-04-14 | End: 2022-07-13

## 2022-04-14 RX ORDER — WARFARIN 4 MG/1
4 TABLET ORAL DAILY
Qty: 90 TABLET | Refills: 1 | Status: SHIPPED | OUTPATIENT
Start: 2022-04-14 | End: 2022-10-13 | Stop reason: SDUPTHER

## 2022-04-14 NOTE — PROGRESS NOTES
Pls call patient. INR at goal (2.9). Let's continue current regimen. Refills sent for coumadin. Sodium low, unclear baseline as it's been a long while since he had labs. This is likely due to adrenal insufficiency--I am forwarding these labs to his Endocrinologist.     Hepatitis C screening negative. Blood counts normal.  Kidney function stable. One liver enzyme is elevated, we will monitor this. LDL, bad cholesterol is high. His risk of heart attack or stroke is over 11% within the next 10 years. We should start a cholesterol medication to lower this risk, along with diet and exercise. Atorvastatin 40mg,  have sent this to the pharmacy.    The 10-year ASCVD risk score (Media Pile., et al., 2013) is: 11.3%

## 2022-04-20 LAB
GGT SERPL-CCNC: 261 IU/L (ref 0–65)
SPECIMEN STATUS REPORT, ROLRST: NORMAL

## 2022-04-22 DIAGNOSIS — R74.8 ELEVATED ALKALINE PHOSPHATASE LEVEL: Primary | ICD-10-CM

## 2022-04-22 DIAGNOSIS — R74.8 ELEVATED SERUM GGT LEVEL: ICD-10-CM

## 2022-04-22 NOTE — PROGRESS NOTES
I called to discuss results with patient. ID confirmed x2. Alk Phos elevated and GGT elevated as well. Referring for ultrasound. Hepatitis C screening negative. CBC normal.   PSA stable at 1.3 (1.5 previously). The 10-year ASCVD risk score (Steve Vaughn, et al., 2013) is: 11.3%  Patient does not want to start medication. Options discussed. He still does not want to try anything. He will come in monthly for his PT/INR checks.    Dr. Alicia Quan

## 2022-04-24 LAB
PSA SERPL-MCNC: 1.3 NG/ML (ref 0–4)
SPECIMEN STATUS REPORT, ROLRST: NORMAL

## 2022-04-27 ENCOUNTER — TELEPHONE (OUTPATIENT)
Dept: PRIMARY CARE CLINIC | Age: 62
End: 2022-04-27

## 2022-04-27 NOTE — TELEPHONE ENCOUNTER
Rachel Harrison [facility] Nurse  Subject: Referral Request     QUESTIONS   Reason for referral request? ultrasound   Has the physician seen you for this condition before? No   Preferred Specialist (if applicable)? Do you already have an appointment scheduled? No   Additional Information for Provider? [facility] at   [address] [place] [address]- please fax referral to radiology scheduling? phone? [phone]   fax number? [phone] patient asked to be notified when this has been   done please if possible.   ---------------------------------------------------------------------------   --------------   [address]   What is the best way for the office to contact you? OK to leave message on   voicemail   Preferred Call Back Phone Number? [phone]   ---------------------------------------------------------------------------   --------------   SCRIPT ANSWERS   Relationship to Patient?  Self

## 2022-05-11 DIAGNOSIS — R93.5 ABNORMAL ULTRASOUND OF ABDOMEN: Primary | ICD-10-CM

## 2022-05-11 DIAGNOSIS — R74.8 ELEVATED SERUM GGT LEVEL: ICD-10-CM

## 2022-05-11 DIAGNOSIS — R74.8 ELEVATED ALKALINE PHOSPHATASE LEVEL: ICD-10-CM

## 2022-05-11 NOTE — PROGRESS NOTES
Please call patient. Ultrasound compatible with liver disease. Does he drink alcohol? I would like him to see liver specialist. Referral placed.

## 2022-05-12 ENCOUNTER — TELEPHONE (OUTPATIENT)
Dept: PRIMARY CARE CLINIC | Age: 62
End: 2022-05-12

## 2022-05-12 NOTE — TELEPHONE ENCOUNTER
----- Message from Sandra Sanchez sent at 5/11/2022  2:47 PM EDT -----  Patient would like to talk with you regarding results. Will be in tomorrow for PT/INR  ----- Message -----  From: Ernie John MD  Sent: 5/11/2022   1:13 PM EDT  To: Sandra Sanchez    Please call patient. Ultrasound compatible with liver disease. Does he drink alcohol? I would like him to see liver specialist. Referral placed.

## 2022-05-12 NOTE — TELEPHONE ENCOUNTER
ID confirmed x2. Patient with questions regarding ultrasound results. I advised him there was concerns for disease of the liver parenchyma.     He is referred to the liver specialist.    Dr. Darren Phillips

## 2022-05-13 LAB
INR PPP: 3 (ref 0.9–1.2)
PROTHROMBIN TIME: 30.2 SEC (ref 9.1–12)

## 2022-06-14 LAB
INR PPP: 3.8 (ref 0.9–1.2)
PROTHROMBIN TIME: 37.7 SEC (ref 9.1–12)

## 2022-06-23 DIAGNOSIS — R79.1 SUBTHERAPEUTIC INTERNATIONAL NORMALIZED RATIO (INR): Primary | ICD-10-CM

## 2022-06-23 LAB
INR PPP: 2.4 (ref 0.9–1.2)
PROTHROMBIN TIME: 24 SEC (ref 9.1–12)

## 2022-06-23 NOTE — PROGRESS NOTES
Pls call patient. INR now low. Did he hold any additional doses. Tell him to take meds as he routinely does and let's check again in 1 week.

## 2022-06-28 ENCOUNTER — TELEPHONE (OUTPATIENT)
Dept: PRIMARY CARE CLINIC | Age: 62
End: 2022-06-28

## 2022-06-28 NOTE — TELEPHONE ENCOUNTER
----- Message from Alexi Hall sent at 6/27/2022 10:32 AM EDT -----  Subject: Message to Provider    QUESTIONS  Information for Provider? Was in last Wed, June 22, 2022, for blood work. Awaiting to hear about the results. ---------------------------------------------------------------------------  --------------  Reynold Danger INFO  What is the best way for the office to contact you? OK to leave message on   voicemail  Preferred Call Back Phone Number? [phone]  ---------------------------------------------------------------------------  --------------  SCRIPT ANSWERS  Relationship to Patient?  Self

## 2022-07-12 LAB
INR PPP: 4.2 (ref 0.9–1.2)
PROTHROMBIN TIME: 42.1 SEC (ref 9.1–12)

## 2022-07-13 DIAGNOSIS — Z79.01 CURRENT USE OF LONG TERM ANTICOAGULATION: Primary | ICD-10-CM

## 2022-07-13 RX ORDER — WARFARIN 2 MG/1
TABLET ORAL
Qty: 24 TABLET | Refills: 0 | Status: SHIPPED | OUTPATIENT
Start: 2022-07-13

## 2022-07-13 NOTE — PROGRESS NOTES
Pls call patient. INR is elevated up. 4.2  Hold today's dose, then continue normal routine. Repeat INR in 1 week.

## 2022-07-13 NOTE — PROGRESS NOTES
Pt. Aware of results. Verbalized understanding of instructions to take meds and get labs repeated Wednesday next week.

## 2022-07-21 LAB
INR PPP: 2.9 (ref 0.9–1.2)
PROTHROMBIN TIME: 29 SEC (ref 9.1–12)

## 2022-08-24 LAB
INR PPP: 3 (ref 0.9–1.2)
PROTHROMBIN TIME: 30.2 SEC (ref 9.1–12)

## 2022-09-20 LAB
INR PPP: 3.3 (ref 0.9–1.2)
PROTHROMBIN TIME: 32.9 SEC (ref 9.1–12)

## 2022-09-23 ENCOUNTER — TELEPHONE (OUTPATIENT)
Dept: PRIMARY CARE CLINIC | Age: 62
End: 2022-09-23

## 2022-10-13 ENCOUNTER — OFFICE VISIT (OUTPATIENT)
Dept: PRIMARY CARE CLINIC | Age: 62
End: 2022-10-13
Payer: MEDICARE

## 2022-10-13 VITALS
WEIGHT: 153 LBS | SYSTOLIC BLOOD PRESSURE: 138 MMHG | OXYGEN SATURATION: 98 % | RESPIRATION RATE: 20 BRPM | TEMPERATURE: 97.2 F | BODY MASS INDEX: 24.01 KG/M2 | HEIGHT: 67 IN | HEART RATE: 73 BPM | DIASTOLIC BLOOD PRESSURE: 80 MMHG

## 2022-10-13 DIAGNOSIS — Z93.3 S/P COLOSTOMY (HCC): ICD-10-CM

## 2022-10-13 DIAGNOSIS — Z86.2 HISTORY OF SARCOIDOSIS: ICD-10-CM

## 2022-10-13 DIAGNOSIS — Z86.718 HISTORY OF DVT (DEEP VEIN THROMBOSIS): ICD-10-CM

## 2022-10-13 DIAGNOSIS — E27.40 ADRENAL INSUFFICIENCY (HCC): ICD-10-CM

## 2022-10-13 DIAGNOSIS — Z23 ENCOUNTER FOR IMMUNIZATION: ICD-10-CM

## 2022-10-13 DIAGNOSIS — E78.9 ABNORMAL CHOLESTEROL TEST: Primary | ICD-10-CM

## 2022-10-13 DIAGNOSIS — R74.8 ELEVATED ALKALINE PHOSPHATASE LEVEL: ICD-10-CM

## 2022-10-13 PROCEDURE — G0008 ADMIN INFLUENZA VIRUS VAC: HCPCS | Performed by: FAMILY MEDICINE

## 2022-10-13 PROCEDURE — 3017F COLORECTAL CA SCREEN DOC REV: CPT | Performed by: FAMILY MEDICINE

## 2022-10-13 PROCEDURE — G8420 CALC BMI NORM PARAMETERS: HCPCS | Performed by: FAMILY MEDICINE

## 2022-10-13 PROCEDURE — G8427 DOCREV CUR MEDS BY ELIG CLIN: HCPCS | Performed by: FAMILY MEDICINE

## 2022-10-13 PROCEDURE — G8510 SCR DEP NEG, NO PLAN REQD: HCPCS | Performed by: FAMILY MEDICINE

## 2022-10-13 PROCEDURE — 90686 IIV4 VACC NO PRSV 0.5 ML IM: CPT | Performed by: FAMILY MEDICINE

## 2022-10-13 PROCEDURE — 99214 OFFICE O/P EST MOD 30 MIN: CPT | Performed by: FAMILY MEDICINE

## 2022-10-13 RX ORDER — WARFARIN 4 MG/1
4 TABLET ORAL DAILY
Qty: 90 TABLET | Refills: 2 | Status: SHIPPED | OUTPATIENT
Start: 2022-10-13

## 2022-10-13 NOTE — PROGRESS NOTES
HPI     Chief Complaint   Patient presents with    Follow-up        HPI:  Good Bunch is a 58 y.o. male who has a history of Colostomy, History DVT, Adrenal Insufficiency, Sarcoidosis. Elevated Alk Phos: he is following with Dr. Kimberlee Morrissey. Several labs ordered. High concern for autoimmune disorder. Dr. Wally Webster note: \"Differential: Strongly concerned about autoimmune cause (PBC in particular), with infiltrative (sarcoid) and cardiac (pulmonary hypertension) also featuring strongly. Doubt this is related to gallstones. \"    Abnormal Lipid Panel: Patient declined statin. The 10-year ASCVD risk score (Ricardo DK, et al., 2019) is: 12.2%  Lab Results   Component Value Date/Time    Cholesterol, total 198 04/13/2022 10:09 AM    HDL Cholesterol 45 04/13/2022 10:09 AM    LDL, calculated 135 (H) 04/13/2022 10:09 AM    VLDL, calculated 18 04/13/2022 10:09 AM    Triglyceride 99 04/13/2022 10:09 AM      Colostomy: years ago patient suffered erforated diverticulitis resulting in colostomy. No melena. History of DVT: is on chronic coumadin, no hematuria or melena. Currently taking coumadin 4mg daily. INR goal 2.5-3.5. Adrenal Insufficiency: Follows with Dr. Josie Spann. 2/2 chronic steroid use for Sarcoidosis. Denies new fatigue. Sarcoidosis: was seeing Dr. Jackie Hathaway but now follows with Dr. Eugenia Rosenthal at [facility]. 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 He has chronic dyspnea. No Known Allergies    Current Outpatient Medications   Medication Sig    warfarin (COUMADIN) 4 mg tablet Take 1 Tablet by mouth daily. ferrous sulfate 325 mg (65 mg iron) tablet TAKE 1 TABLET BY MOUTH EVERY DAY FOR 2 MONTHS THEN STOP    sodium chloride 1 gram tablet Take 1 g by mouth daily. hydrocortisone (CORTEF) 10 mg tablet Take 10 mg by mouth daily. q am, with breakfast    hydrocortisone (CORTEF) 5 mg tablet Take 5 mg by mouth daily (after dinner). warfarin (COUMADIN) 2 mg tablet TAKE 1 TABLET BY MOUTH ON TUESDAYS AND THURSDAYS (Patient not taking: Reported on 10/13/2022)     No current facility-administered medications for this visit. Review of Systems   Constitutional:  Negative for chills and fever. Respiratory:  Negative for hemoptysis and shortness of breath. Gastrointestinal:  Negative for abdominal pain and blood in stool. Genitourinary:  Negative for dysuria and hematuria. Reviewed PmHx, FmHx, SocHx as well as meds and allergies, updated and dated in the chart. Objective     Visit Vitals  /80 (BP 1 Location: Left upper arm, BP Patient Position: Sitting, BP Cuff Size: Adult)   Pulse 73   Temp 97.2 °F (36.2 °C) (Temporal)   Resp 20   Ht 5' 7\" (1.702 m)   Wt 153 lb (69.4 kg)   SpO2 98%   BMI 23.96 kg/m²     Physical Exam  Vitals and nursing note reviewed. Constitutional:       General: He is not in acute distress. Cardiovascular:      Rate and Rhythm: Normal rate and regular rhythm. Heart sounds: No murmur heard. Pulmonary:      Effort: Pulmonary effort is normal. No respiratory distress. Abdominal:      Comments: Colostomy bag is in place. Assessment and Plan     Diagnoses and all orders for this visit:    1. Abnormal cholesterol test  Comments:  Rechecking today. Patient declines meds. Aware of elevated risk. Orders:  -     LIPID PANEL    2. Encounter for immunization  -     INFLUENZA, FLUARIX, FLULAVAL, FLUZONE (AGE 6 MO+), AFLURIA(AGE 3Y+) IM, PF, 0.5 ML    3. Elevated alkaline phosphatase level  Comments: With elevated GGT. Concern for Autoimmune disorder. Following with Dr. Joanna Singleton     4. History of DVT (deep vein thrombosis)  Comments:  Continue warfarin 4 mg daily. Goal INR is 2.5-3.5.    5. History of sarcoidosis  Comments:  Initially there was concern for sarcoidosis but later believed to be organizing pneumonia.   This history is also concerning given possible autoimmune biliary di    6. Adrenal insufficiency ([facility] [address]  Comments: On hydrocortisone daily follows with Dr. Dafne Griffin.    7. S/P colostomy [facility])    Other orders  -     warfarin (COUMADIN) 4 mg tablet; Take 1 Tablet by mouth daily. As applicable:  Medication Side Effects and Warnings were discussed with patient. Patient Labs were reviewed and or requested. Patient Past Records were reviewed and or requested. Follow-up and Dispositions    Return for [facility].          I have discussed the diagnosis with the patient and the intended plan as seen in the above orders. The patient has received an after-visit summary and questions were answered concerning future plans. I have discussed medication side effects and warnings with the patient as well.       Shakeel Thomas MD  [address] [place] [address]

## 2022-10-13 NOTE — PROGRESS NOTES
1. \"Have you been to the ER, urgent care clinic since your last visit? Hospitalized since your last visit? \" No    2. \"Have you seen or consulted any other health care providers outside of the [address] [place] [address] since your last visit? \" No     3. For patients aged 39-70: Has the patient had a colonoscopy / FIT/ Cologuard? Yes - Care Gap present. Most recent result on file      If the patient is female:    4. For patients aged 41-77: Has the patient had a mammogram within the past 2 years? NA - based on age or sex      11. For patients aged 21-65: Has the patient had a pap smear?  NA - based on age or sex  Chief Complaint   Patient presents with    Follow-up     Visit Vitals  /80 (BP 1 Location: Left upper arm, BP Patient Position: Sitting, BP Cuff Size: Adult)   Pulse 73   Temp 97.2 °F (36.2 °C) (Temporal)   Resp 20   Ht 5' 7\" (1.702 m)   Wt 153 lb (69.4 kg)   SpO2 98%   BMI 23.96 kg/m²

## 2022-10-14 ENCOUNTER — TELEPHONE (OUTPATIENT)
Dept: PRIMARY CARE CLINIC | Age: 62
End: 2022-10-14

## 2022-10-14 DIAGNOSIS — R79.1 SUPRATHERAPEUTIC INR: Primary | ICD-10-CM

## 2022-10-14 LAB
CHOLEST SERPL-MCNC: 201 MG/DL (ref 100–199)
HDLC SERPL-MCNC: 45 MG/DL
INR PPP: 4 (ref 0.9–1.2)
LDLC SERPL CALC-MCNC: 124 MG/DL (ref 0–99)
PROTHROMBIN TIME: 39.5 SEC (ref 9.1–12)
TRIGL SERPL-MCNC: 183 MG/DL (ref 0–149)
VLDLC SERPL CALC-MCNC: 32 MG/DL (ref 5–40)

## 2022-10-14 NOTE — PROGRESS NOTES
Called and spoke with patient, will hold one dose and resume current dose, will recheck next Thursday.

## 2022-10-14 NOTE — PROGRESS NOTES
Please have patient hold his Coumadin for 1 day and then resume 4 mg daily. Have him recheck INR next Thursday.

## 2022-10-21 LAB
INR PPP: 2.8 (ref 0.9–1.2)
PROTHROMBIN TIME: 27.5 SEC (ref 9.1–12)

## 2022-10-21 NOTE — PROGRESS NOTES
Pls call patient. Advise INR at goal. Resume normal dosing. Repeat in one week on normal dosing.      Dr. Aly Wyman

## 2022-10-28 LAB
INR PPP: 3.3 (ref 0.9–1.2)
PROTHROMBIN TIME: 32.8 SEC (ref 9.1–12)

## 2022-12-12 ENCOUNTER — TELEPHONE (OUTPATIENT)
Dept: PRIMARY CARE CLINIC | Age: 62
End: 2022-12-12

## 2022-12-13 DIAGNOSIS — Z79.01 CURRENT USE OF LONG TERM ANTICOAGULATION: ICD-10-CM

## 2022-12-13 DIAGNOSIS — Z86.718 HISTORY OF DVT (DEEP VEIN THROMBOSIS): Primary | ICD-10-CM

## 2022-12-14 LAB
INR PPP: 2.6 (ref 0.9–1.2)
PROTHROMBIN TIME: 26.1 SEC (ref 9.1–12)

## 2022-12-14 RX ORDER — WARFARIN 3 MG/1
3 TABLET ORAL DAILY
Qty: 90 TABLET | Refills: 1 | Status: SHIPPED | OUTPATIENT
Start: 2022-12-14

## 2023-01-17 LAB
INR PPP: 2.5 (ref 0.9–1.2)
PROTHROMBIN TIME: 24.8 SEC (ref 9.1–12)

## 2023-01-18 NOTE — PROGRESS NOTES
Please inform patient that his INR is therapeutic, no changes required to his Coumadin dosing. Continue Coumadin at present dosing. Recheck PT/INR month.

## 2023-02-24 ENCOUNTER — TELEPHONE (OUTPATIENT)
Dept: PRIMARY CARE CLINIC | Age: 63
End: 2023-02-24

## 2023-03-14 ENCOUNTER — TELEPHONE (OUTPATIENT)
Dept: PRIMARY CARE CLINIC | Age: 63
End: 2023-03-14

## 2023-03-14 NOTE — TELEPHONE ENCOUNTER
----- Message from Bertin Brooks sent at 3/13/2023  9:02 AM EDT -----  Subject: Referral Request    Reason for referral request? Labs  Provider patient wants to be referred to(if known):     Provider Phone Number(if known):     Additional Information for Provider? needs labs b/c he is on blood thinner  ---------------------------------------------------------------------------  --------------  Gloria Ni INFO    [phone]; OK to leave message on voicemail  ---------------------------------------------------------------------------  --------------

## 2023-03-22 LAB
INR PPP: 2.4 (ref 0.9–1.2)
PROTHROMBIN TIME: 23.9 SEC (ref 9.1–12)

## 2023-03-23 DIAGNOSIS — R79.1 SUBTHERAPEUTIC INTERNATIONAL NORMALIZED RATIO (INR): Primary | ICD-10-CM

## 2023-03-23 NOTE — PROGRESS NOTES
Please call patient. INR just outside of goal at 2.4. Any new medicines or changes? Lets do his next INR check in 1 week.

## 2023-04-01 LAB
INR PPP: 2.7 (ref 0.9–1.2)
PROTHROMBIN TIME: 27.2 SEC (ref 9.1–12)

## 2023-05-01 ENCOUNTER — TELEPHONE (OUTPATIENT)
Dept: PRIMARY CARE CLINIC | Age: 63
End: 2023-05-01

## 2023-05-01 NOTE — TELEPHONE ENCOUNTER
----- Message from Alf Carlos sent at 5/1/2023 10:38 AM EDT -----  Subject: Referral Request    Reason for referral request? PROTHROMBIN TIME + INR  Provider patient wants to be referred to(if known):     Provider Phone Number(if known): Additional Information for Provider? Pt believes there is a standing   order. Did not see on chart.  Please call to advise and schedule.   ---------------------------------------------------------------------------  --------------  Tod Jordan INFO    [phone]; OK to leave message on voicemail  ---------------------------------------------------------------------------  --------------

## 2023-05-02 DIAGNOSIS — Z79.01 CURRENT USE OF LONG TERM ANTICOAGULATION: ICD-10-CM

## 2023-05-02 DIAGNOSIS — Z86.718 HISTORY OF DVT (DEEP VEIN THROMBOSIS): Primary | ICD-10-CM

## 2023-05-05 LAB
INR PPP: 3.1 (ref 0.9–1.2)
PROTHROMBIN TIME: 31 SEC (ref 9.1–12)

## 2023-05-08 DIAGNOSIS — Z86.718 HISTORY OF DVT (DEEP VEIN THROMBOSIS): Primary | ICD-10-CM

## 2023-06-02 DIAGNOSIS — Z86.718 HISTORY OF DVT (DEEP VEIN THROMBOSIS): ICD-10-CM

## 2023-06-30 ENCOUNTER — TELEPHONE (OUTPATIENT)
Dept: PRIMARY CARE CLINIC | Facility: CLINIC | Age: 63
End: 2023-06-30

## 2023-07-02 DIAGNOSIS — Z86.718 HISTORY OF DVT (DEEP VEIN THROMBOSIS): ICD-10-CM

## 2023-07-06 ENCOUNTER — TELEPHONE (OUTPATIENT)
Dept: PRIMARY CARE CLINIC | Facility: CLINIC | Age: 63
End: 2023-07-06

## 2023-07-06 NOTE — TELEPHONE ENCOUNTER
Care Transitions Initial Follow Up Call  Bowel Obstruction, congestive heart failure    Call within 2 business days of discharge: No     Patient: Sumi Pop Patient : 1960 MRN: [id number]                         [unfilled] Discharged 2023    RARS: No data recorded     Spoke with: Patient    Discharge department/facility: [facility]    Non-face-to-face services provided:       Follow Up  Future Appointments   Date Time Provider [address] [place] [address]   10/12/2023  1:00 PM Kylie Minaya MD [facility] JOANA Kruse

## 2023-07-07 ENCOUNTER — TELEPHONE (OUTPATIENT)
Dept: PRIMARY CARE CLINIC | Facility: CLINIC | Age: 63
End: 2023-07-07

## 2023-07-07 NOTE — TELEPHONE ENCOUNTER
----- Message from Jose Enrique Maria G sent at 7/7/2023  9:42 AM EDT -----  Subject: Appointment Request    Reason for Call: New Patient/New to Provider Appointment needed: New   Patient Request Appointment    QUESTIONS    Reason for appointment request? Requested Provider unavailable - Radha Stone     Additional Information for Provider? Patient wants to come in asap to see   Dr. Radha Guevara to establish. He wants to get a liver function test and have   the doctor fill out paperwork for medication that he needs. Could the   doctor see him right away?  Please advise patient.  ---------------------------------------------------------------------------  --------------  Sofya Echavarria DZSX  [phone]; OK to leave message on voicemail  ---------------------------------------------------------------------------  --------------  SCRIPT ANSWERS

## 2023-07-12 ENCOUNTER — TELEPHONE (OUTPATIENT)
Facility: CLINIC | Age: 63
End: 2023-07-12

## 2023-07-12 NOTE — TELEPHONE ENCOUNTER
----- Message from [facility] sent at 6/29/2023  1:19 PM EDT -----  Subject: Message to Provider    QUESTIONS  Information for Provider? Lilliana with [facility] called in to   see if the patient's provider will follow the patient for [address] Please contact Lilliana to further assist. She can reached at   [phone].   ---------------------------------------------------------------------------  --------------  [address]  [phone]; OK to leave message on voicemail  ---------------------------------------------------------------------------  --------------  SCRIPT ANSWERS  Relationship to Patient? Covered Entity  Covered Entity Type? Home Health Care? Representative Name?  [facility]

## 2023-08-02 DIAGNOSIS — Z86.718 HISTORY OF DVT (DEEP VEIN THROMBOSIS): ICD-10-CM

## 2023-08-17 ENCOUNTER — OFFICE VISIT (OUTPATIENT)
Dept: PRIMARY CARE CLINIC | Facility: CLINIC | Age: 63
End: 2023-08-17
Payer: MEDICARE

## 2023-08-17 VITALS
BODY MASS INDEX: 20.25 KG/M2 | WEIGHT: 129 LBS | OXYGEN SATURATION: 97 % | HEART RATE: 74 BPM | DIASTOLIC BLOOD PRESSURE: 76 MMHG | TEMPERATURE: 97.6 F | RESPIRATION RATE: 22 BRPM | SYSTOLIC BLOOD PRESSURE: 113 MMHG | HEIGHT: 67 IN

## 2023-08-17 DIAGNOSIS — R74.8 ELEVATED ALKALINE PHOSPHATASE LEVEL: ICD-10-CM

## 2023-08-17 DIAGNOSIS — I50.20 HEART FAILURE WITH REDUCED EJECTION FRACTION (HCC): ICD-10-CM

## 2023-08-17 DIAGNOSIS — D86.9 SARCOIDOSIS: ICD-10-CM

## 2023-08-17 DIAGNOSIS — Z86.718 HISTORY OF DVT (DEEP VEIN THROMBOSIS): ICD-10-CM

## 2023-08-17 DIAGNOSIS — Z79.01 CHRONIC ANTICOAGULATION: ICD-10-CM

## 2023-08-17 DIAGNOSIS — E22.2 SIADH (SYNDROME OF INAPPROPRIATE ADH PRODUCTION) (HCC): ICD-10-CM

## 2023-08-17 DIAGNOSIS — E78.5 DYSLIPIDEMIA: Primary | ICD-10-CM

## 2023-08-17 DIAGNOSIS — D68.61 ANTIPHOSPHOLIPID SYNDROME (HCC): ICD-10-CM

## 2023-08-17 DIAGNOSIS — E27.40 ADRENOCORTICAL INSUFFICIENCY (HCC): ICD-10-CM

## 2023-08-17 PROBLEM — Z93.3 COLOSTOMY STATUS (HCC): Status: ACTIVE | Noted: 2023-08-17

## 2023-08-17 PROCEDURE — G8427 DOCREV CUR MEDS BY ELIG CLIN: HCPCS | Performed by: FAMILY MEDICINE

## 2023-08-17 PROCEDURE — G8420 CALC BMI NORM PARAMETERS: HCPCS | Performed by: FAMILY MEDICINE

## 2023-08-17 PROCEDURE — 1036F TOBACCO NON-USER: CPT | Performed by: FAMILY MEDICINE

## 2023-08-17 PROCEDURE — 3017F COLORECTAL CA SCREEN DOC REV: CPT | Performed by: FAMILY MEDICINE

## 2023-08-17 PROCEDURE — 99215 OFFICE O/P EST HI 40 MIN: CPT | Performed by: FAMILY MEDICINE

## 2023-08-17 RX ORDER — FUROSEMIDE 20 MG/1
TABLET ORAL
COMMUNITY
Start: 2023-06-30

## 2023-08-17 RX ORDER — POTASSIUM CHLORIDE 1500 MG/1
40 TABLET, EXTENDED RELEASE ORAL DAILY
COMMUNITY
Start: 2023-06-30

## 2023-08-17 RX ORDER — POSACONAZOLE 100 MG/1
TABLET, DELAYED RELEASE ORAL
COMMUNITY
Start: 2023-07-03

## 2023-08-17 SDOH — ECONOMIC STABILITY: FOOD INSECURITY: WITHIN THE PAST 12 MONTHS, THE FOOD YOU BOUGHT JUST DIDN'T LAST AND YOU DIDN'T HAVE MONEY TO GET MORE.: NEVER TRUE

## 2023-08-17 SDOH — ECONOMIC STABILITY: FOOD INSECURITY: WITHIN THE PAST 12 MONTHS, YOU WORRIED THAT YOUR FOOD WOULD RUN OUT BEFORE YOU GOT MONEY TO BUY MORE.: NEVER TRUE

## 2023-08-17 SDOH — ECONOMIC STABILITY: HOUSING INSECURITY
IN THE LAST 12 MONTHS, WAS THERE A TIME WHEN YOU DID NOT HAVE A STEADY PLACE TO SLEEP OR SLEPT IN A SHELTER (INCLUDING NOW)?: NO

## 2023-08-17 SDOH — ECONOMIC STABILITY: INCOME INSECURITY: HOW HARD IS IT FOR YOU TO PAY FOR THE VERY BASICS LIKE FOOD, HOUSING, MEDICAL CARE, AND HEATING?: NOT HARD AT ALL

## 2023-08-17 NOTE — PROGRESS NOTES
Prabha Abdul (: 1960) is a 61 y.o. male, established patient, here for evaluation of the following chief complaint(s):  Medication Refill (Follow up/)       ASSESSMENT/PLAN:  Below is the assessment and plan developed based on review of pertinent history, physical exam, labs, studies, and medications. 1. Dyslipidemia  Chronic  -     Lipid Panel  Uncontrolled, not on statin. Patient declines medication. 2. History of DVT (deep vein thrombosis)  Noted  -     Protime-INR; Standing  -     CBC with Auto Differential  On Coumadin 3 mg and 4 mg on alternating days. 3. Chronic anticoagulation  Chronic  -     Protime-INR; Standing  -     CBC with Auto Differential  4. Antiphospholipid syndrome (HCC)  Chronic  5. SIADH (syndrome of inappropriate ADH production) (HCC)  Chronic  No longer taking sodium. 6. Adrenocortical insufficiency (HCC)  Chronic  On hydrocortisone daily. 7. Elevated alkaline phosphatase level  Chronic  Being followed by GI. 8. Sarcoidosis/organizing pneumonia  Chronic  ID following for suspected aspergillosis patient on posaconazole. 9. Heart failure with reduced ejection fraction (HCC)  Chronic  On furosemide, cardiology following. Return in about 4 months (around 2023) for chronic care follow-up. SUBJECTIVE/OBJECTIVE:  HPI    77-year-old man with complex medical history including antiphospholipid syndrome and history of blood clots on chronic anticoagulation, dyslipidemia, SIADH, and adrenocortical insufficiency, and sarcoidosis presents to office for chronic care follow-up. Patient has a history of diverticulitis with colectomy and diverting ostomy. He was admitted to [facility] from 2023 - 2023 for SBO but hospital course was complicated by acute hypoxic respiratory failure requiring intubation from  - 2023. He underwent BAL which was positive for Aspergillus antigen. ID was consulted and pulmonary aspergillus was suspected.   Patient was

## 2023-08-17 NOTE — PROGRESS NOTES
Identified Patient with two Patient identifiers(name and ). 1. Have you been to the ER, urgent care clinic since your last visit? Hospitalized since your last visit? [facility] inpatient    2. Have you seen or consulted any other health care providers outside of the [address] [place] [address] since your last visit? Sched to f/u w/ID at [facility] and liver specialist; had MRI's and CT scans      3. For patients aged 43-73: Has the patient had a colonoscopy / FIT/ Cologuard? No    If the patient is female:    4. For patients aged 43-66: Has the patient had a mammogram within the past 2 years? NA - based on age/sex      5. For patients aged 21-65: Has the patient had a pap smear? NA - based on age/sex   There were no vitals taken for this visit. Chief Complaint   Patient presents with    Medication Refill     Follow up         Health Maintenance Due   Topic Date Due    HIV screen  Never done    Colorectal Cancer Screen  Never done    Shingles vaccine (1 of 2) Never done    Flu vaccine (1) 2023          No questionnaires available.

## 2023-08-18 LAB
BASOPHILS # BLD AUTO: 0.1 X10E3/UL (ref 0–0.2)
BASOPHILS NFR BLD AUTO: 1 %
CHOLEST SERPL-MCNC: 136 MG/DL (ref 100–199)
EOSINOPHIL # BLD AUTO: 0.4 X10E3/UL (ref 0–0.4)
EOSINOPHIL NFR BLD AUTO: 5 %
ERYTHROCYTE [DISTWIDTH] IN BLOOD BY AUTOMATED COUNT: 14.4 % (ref 11.6–15.4)
HCT VFR BLD AUTO: 37.9 % (ref 37.5–51)
HDLC SERPL-MCNC: 47 MG/DL
HGB BLD-MCNC: 12 G/DL (ref 13–17.7)
IMM GRANULOCYTES # BLD AUTO: 0.1 X10E3/UL (ref 0–0.1)
IMM GRANULOCYTES NFR BLD AUTO: 1 %
INR PPP: 2.6 (ref 0.9–1.2)
LDLC SERPL CALC-MCNC: 73 MG/DL (ref 0–99)
LYMPHOCYTES # BLD AUTO: 1.6 X10E3/UL (ref 0.7–3.1)
LYMPHOCYTES NFR BLD AUTO: 19 %
MCH RBC QN AUTO: 27.6 PG (ref 26.6–33)
MCHC RBC AUTO-ENTMCNC: 31.7 G/DL (ref 31.5–35.7)
MCV RBC AUTO: 87 FL (ref 79–97)
MONOCYTES # BLD AUTO: 0.9 X10E3/UL (ref 0.1–0.9)
MONOCYTES NFR BLD AUTO: 11 %
NEUTROPHILS # BLD AUTO: 5.4 X10E3/UL (ref 1.4–7)
NEUTROPHILS NFR BLD AUTO: 63 %
PLATELET # BLD AUTO: 218 X10E3/UL (ref 150–450)
PROTHROMBIN TIME: 26.1 SEC (ref 9.1–12)
RBC # BLD AUTO: 4.35 X10E6/UL (ref 4.14–5.8)
TRIGL SERPL-MCNC: 80 MG/DL (ref 0–149)
VLDLC SERPL CALC-MCNC: 16 MG/DL (ref 5–40)
WBC # BLD AUTO: 8.4 X10E3/UL (ref 3.4–10.8)

## 2023-08-23 NOTE — TELEPHONE ENCOUNTER
Patient called requesting RX-Klor Con 20 MEQ TBCR tablets and RX-Furosemide 20 mg be sent to [facility] [address]      Last appt - 08/17/23  Next appt- 10/12/23

## 2023-08-24 RX ORDER — FUROSEMIDE 20 MG/1
TABLET ORAL
Qty: 30 TABLET | Refills: 3 | Status: SHIPPED | OUTPATIENT
Start: 2023-08-24

## 2023-08-24 RX ORDER — POTASSIUM CHLORIDE 1500 MG/1
40 TABLET, EXTENDED RELEASE ORAL DAILY
Qty: 60 TABLET | Refills: 3 | Status: SHIPPED | OUTPATIENT
Start: 2023-08-24

## 2023-09-21 ENCOUNTER — TELEPHONE (OUTPATIENT)
Dept: PRIMARY CARE CLINIC | Facility: CLINIC | Age: 63
End: 2023-09-21

## 2023-09-21 NOTE — TELEPHONE ENCOUNTER
----- Message from Mane Sow sent at 2023  3:30 PM EDT -----  Subject: Message to Provider    QUESTIONS  Information for Provider?  home calling to notify office of a death   certificate needing signed. PLEASE CALL if further info is needed. PHONE   [phone]  ---------------------------------------------------------------------------  --------------  [address]  [phone]; OK to leave message on voicemail  ---------------------------------------------------------------------------  --------------  SCRIPT ANSWERS  Relationship to Patient? Covered Entity  Covered Entity Type? Other  Other Covered Entity Type? [facility] [place]  Representative Name?  Farida

## 2023-11-20 RX ORDER — FUROSEMIDE 20 MG/1
TABLET ORAL
Qty: 90 TABLET | Refills: 0 | Status: SHIPPED | OUTPATIENT
Start: 2023-11-20